# Patient Record
Sex: FEMALE | Race: WHITE | Employment: STUDENT | URBAN - METROPOLITAN AREA
[De-identification: names, ages, dates, MRNs, and addresses within clinical notes are randomized per-mention and may not be internally consistent; named-entity substitution may affect disease eponyms.]

---
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
## 2022-04-06 ENCOUNTER — NURSE ONLY (OUTPATIENT)
Dept: ELECTROPHYSIOLOGY | Facility: HOSPITAL | Age: 22
End: 2022-04-06
Attending: Other
Payer: COMMERCIAL

## 2022-04-06 DIAGNOSIS — G40.309 EPILEPSY SEIZURE, NONCONVULSIVE, GENERALIZED (HCC): ICD-10-CM

## 2022-04-06 PROCEDURE — 95816 EEG AWAKE AND DROWSY: CPT

## 2022-04-07 NOTE — PROCEDURES
[facility], [address] [place] [address]      PATIENT'S NAME: Any Zuluaga   ATTENDING PHYSICIAN: Quita Ward M.D. PATIENT ACCOUNT #: [de-identified] LOCATION: EEG   [facility]W   MEDICAL RECORD #: [id number] YOB: 2000   DATE OF SERVICE: 04/06/2022       ELECTROENCEPHALOGRAM REPORT    DATE OF EXAMINATION:  04/06/2022  AGE: 21 Yrs. SEX: F   EEG #: [id number]     1. 10-20 International System. 2.  Bipolar and monopolar recording. 3.  Routine recording (awake and asleep). 4.  Portable C.E.S.  5.  Impedance - 10 kilohms or less. CLINICAL HISTORY:  This patient has non-convulsive and generalized non-convulsive seizure, has been on lamotrigine. EEG was requested to rule out epileptiform activity as a followup study. Previous EEG last year on September 23, 2021, was abnormal.      INTERPRETATION:  Recording revealed background activity of 8-9 Hz. Amplitude of 20-50 microvolts was seen in bilateral hemispheres. Background activity was evenly distributed, reactive to eye opening. Stage 2 sleep was not evident, however, drowsiness was seen. No epileptiform activity was identified during this study. Activation procedure was not contributory. IMPRESSION:  This is a normal study. There is no epileptiform activity identified during this EEG recording. Clinical correlation is indicated.       Dictated By Quita Ward M.D.  d: 04/06/2022 13:16:57  t: 04/06/2022 14:56:26  [facility] [id number]  /

## 2022-04-13 ENCOUNTER — TELEPHONE (OUTPATIENT)
Dept: NEUROLOGY | Facility: CLINIC | Age: 22
End: 2022-04-13

## 2022-04-13 NOTE — TELEPHONE ENCOUNTER
----- Message from Quita Ward MD sent at 4/7/2022  8:27 AM CDT -----  EEG is normal    Sent on Jildy

## 2022-04-28 ENCOUNTER — OFFICE VISIT (OUTPATIENT)
Dept: NEUROLOGY | Facility: CLINIC | Age: 22
End: 2022-04-28
Payer: COMMERCIAL

## 2022-04-28 VITALS
RESPIRATION RATE: 12 BRPM | WEIGHT: 153 LBS | SYSTOLIC BLOOD PRESSURE: 110 MMHG | BODY MASS INDEX: 22.92 KG/M2 | HEART RATE: 101 BPM | DIASTOLIC BLOOD PRESSURE: 62 MMHG | HEIGHT: 68.5 IN

## 2022-04-28 DIAGNOSIS — R40.4 ALTERED AWARENESS, TRANSIENT: Primary | ICD-10-CM

## 2022-04-28 DIAGNOSIS — R55 BLACK-OUT (NOT AMNESIA): ICD-10-CM

## 2022-04-28 DIAGNOSIS — G40.309 EPILEPSY SEIZURE, NONCONVULSIVE, GENERALIZED (HCC): ICD-10-CM

## 2022-04-28 PROCEDURE — 3078F DIAST BP <80 MM HG: CPT | Performed by: OTHER

## 2022-04-28 PROCEDURE — 3008F BODY MASS INDEX DOCD: CPT | Performed by: OTHER

## 2022-04-28 PROCEDURE — 3074F SYST BP LT 130 MM HG: CPT | Performed by: OTHER

## 2022-04-28 PROCEDURE — 99214 OFFICE O/P EST MOD 30 MIN: CPT | Performed by: OTHER

## 2022-04-28 RX ORDER — LAMOTRIGINE 100 MG/1
TABLET ORAL
Qty: 360 TABLET | Refills: 3 | Status: SHIPPED | OUTPATIENT
Start: 2022-04-28

## 2022-04-28 RX ORDER — AMITRIPTYLINE HYDROCHLORIDE 10 MG/1
10 TABLET, FILM COATED ORAL NIGHTLY
Qty: 30 TABLET | Refills: 5 | Status: SHIPPED | OUTPATIENT
Start: 2022-04-28

## 2022-04-28 NOTE — PROGRESS NOTES
LOV 9/28/21 Seizure f/u- Patient is still having \"spacing out\" seizures. Patient denies any missed dose of lamotrigine.

## 2022-05-01 ENCOUNTER — PATIENT MESSAGE (OUTPATIENT)
Dept: NEUROLOGY | Facility: CLINIC | Age: 22
End: 2022-05-01

## 2022-05-02 NOTE — TELEPHONE ENCOUNTER
COREYB on VM (ok per HIPAA consent) to gather more information regarding Grand Mal seizure on Saturday.

## 2022-05-02 NOTE — TELEPHONE ENCOUNTER
S: Patient to ER on 4/30/2022 for seizure. Patient bit tongue and had laceration. Observed tonic-clinic activity. See ER Note. B: LOV 4/28/2022 -     titration Lamictal 150 mg BID to 200 mg BID    A: Patient reports she had Tonic-clonic seizure. Currently taking 150 mg Lamictal BID. Increasing to 200 mg Lamictal next week. Denies any increased substance used, denies any dehydration and reports she had a few hours less sleep, but denied lack of sleep. R: Routed to provider to advise. Pt aware to go to ER should she have another seizure lasting more than 5 min or seizures with no return to baseline.

## 2022-05-13 ENCOUNTER — TELEPHONE (OUTPATIENT)
Dept: NEUROLOGY | Facility: CLINIC | Age: 22
End: 2022-05-13

## 2022-05-13 RX ORDER — LAMOTRIGINE 200 MG/1
200 TABLET ORAL 2 TIMES DAILY
Qty: 180 TABLET | Refills: 1 | Status: SHIPPED | OUTPATIENT
Start: 2022-05-13

## 2022-05-13 NOTE — TELEPHONE ENCOUNTER
Pt calling for updated prescription for Lamotrigine. Pt was taking 150mg twice a day for a week and then is to increase to 200mg twice a week.      [facility] [address] [place] [address] OF [address], [phone], [phone]

## 2022-05-13 NOTE — TELEPHONE ENCOUNTER
Per Epic in TE dated 5/2/2022, Lamictal increased to 200 mg BID. Lior Briones MD  to [id number] [address] 1:06 PM  Note  Need to up to 200 mg bid          New RX sent to pharmacy reflecting new dosage and RN called pharmacy to verify receipt. Pharmacist verified receipt and stated they will fill. RN called patient and advised of above.

## 2022-07-01 ENCOUNTER — OFFICE VISIT (OUTPATIENT)
Dept: FAMILY MEDICINE CLINIC | Facility: CLINIC | Age: 22
End: 2022-07-01
Payer: COMMERCIAL

## 2022-07-01 VITALS
SYSTOLIC BLOOD PRESSURE: 110 MMHG | BODY MASS INDEX: 23.67 KG/M2 | HEIGHT: 68.5 IN | HEART RATE: 84 BPM | WEIGHT: 158 LBS | DIASTOLIC BLOOD PRESSURE: 60 MMHG | TEMPERATURE: 98 F | RESPIRATION RATE: 16 BRPM

## 2022-07-01 DIAGNOSIS — Z51.81 ENCOUNTER FOR MEDICATION MONITORING: ICD-10-CM

## 2022-07-01 DIAGNOSIS — G40.409 GRAND MAL SEIZURE DISORDER (HCC): Primary | ICD-10-CM

## 2022-07-01 DIAGNOSIS — R11.0 NAUSEA: ICD-10-CM

## 2022-07-01 DIAGNOSIS — G43.109 MIGRAINE WITH AURA AND WITHOUT STATUS MIGRAINOSUS, NOT INTRACTABLE: ICD-10-CM

## 2022-07-01 DIAGNOSIS — G40.309 EPILEPSY SEIZURE, NONCONVULSIVE, GENERALIZED (HCC): ICD-10-CM

## 2022-07-01 DIAGNOSIS — N94.6 DYSMENORRHEA: ICD-10-CM

## 2022-07-01 PROCEDURE — 99213 OFFICE O/P EST LOW 20 MIN: CPT | Performed by: FAMILY MEDICINE

## 2022-07-01 PROCEDURE — 3074F SYST BP LT 130 MM HG: CPT | Performed by: FAMILY MEDICINE

## 2022-07-01 PROCEDURE — 3078F DIAST BP <80 MM HG: CPT | Performed by: FAMILY MEDICINE

## 2022-07-01 PROCEDURE — 3008F BODY MASS INDEX DOCD: CPT | Performed by: FAMILY MEDICINE

## 2022-07-01 RX ORDER — ONDANSETRON 4 MG/1
4 TABLET, ORALLY DISINTEGRATING ORAL EVERY 8 HOURS PRN
Qty: 30 TABLET | Refills: 2 | Status: SHIPPED | OUTPATIENT
Start: 2022-07-01

## 2022-07-01 RX ORDER — NAPROXEN 500 MG/1
TABLET ORAL
Qty: 30 TABLET | Refills: 3 | Status: SHIPPED | OUTPATIENT
Start: 2022-07-01

## 2022-07-10 ENCOUNTER — PATIENT MESSAGE (OUTPATIENT)
Dept: FAMILY MEDICINE CLINIC | Facility: CLINIC | Age: 22
End: 2022-07-10

## 2022-07-13 ENCOUNTER — TELEPHONE (OUTPATIENT)
Dept: FAMILY MEDICINE CLINIC | Facility: CLINIC | Age: 22
End: 2022-07-13

## 2022-07-13 NOTE — TELEPHONE ENCOUNTER
Message left on pt's cell number re: symptoms. [employer] message sent to pt re: symptoms. If symptoms worse - cough/shorntes of breath - to go to immediate care for further evaluation.

## 2022-07-24 ENCOUNTER — OFFICE VISIT (OUTPATIENT)
Dept: FAMILY MEDICINE CLINIC | Facility: CLINIC | Age: 22
End: 2022-07-24
Payer: COMMERCIAL

## 2022-07-24 VITALS
SYSTOLIC BLOOD PRESSURE: 122 MMHG | DIASTOLIC BLOOD PRESSURE: 64 MMHG | OXYGEN SATURATION: 98 % | HEIGHT: 68 IN | TEMPERATURE: 98 F | WEIGHT: 150 LBS | BODY MASS INDEX: 22.73 KG/M2 | RESPIRATION RATE: 18 BRPM | HEART RATE: 107 BPM

## 2022-07-24 DIAGNOSIS — J01.40 ACUTE NON-RECURRENT PANSINUSITIS: Primary | ICD-10-CM

## 2022-07-24 RX ORDER — AMOXICILLIN AND CLAVULANATE POTASSIUM 875; 125 MG/1; MG/1
1 TABLET, FILM COATED ORAL 2 TIMES DAILY
Qty: 20 TABLET | Refills: 0 | Status: SHIPPED | OUTPATIENT
Start: 2022-07-24 | End: 2022-08-03

## 2022-07-24 RX ORDER — BENZONATATE 200 MG/1
200 CAPSULE ORAL 3 TIMES DAILY PRN
Qty: 20 CAPSULE | Refills: 0 | Status: SHIPPED | OUTPATIENT
Start: 2022-07-24 | End: 2022-07-31

## 2022-09-27 ENCOUNTER — PATIENT MESSAGE (OUTPATIENT)
Dept: FAMILY MEDICINE CLINIC | Facility: CLINIC | Age: 22
End: 2022-09-27

## 2022-09-27 NOTE — TELEPHONE ENCOUNTER
Shawn Rizvi RN 9/27/2022 10:47 AM CDT      ----- Message -----  From: Marissa Martini  Sent: 9/27/2022 10:45 AM CDT  To: [facility] Clinical Staff  Subject: Switching Pharmacies     Hello I recently moved to [place] for school I was wondering if you could switch my pharmacy from the one I have at home to the one I added on Mychart. So i can get my medication here.

## 2022-10-04 ENCOUNTER — PATIENT MESSAGE (OUTPATIENT)
Dept: NEUROLOGY | Facility: CLINIC | Age: 22
End: 2022-10-04

## 2022-10-04 DIAGNOSIS — G40.309 EPILEPSY SEIZURE, NONCONVULSIVE, GENERALIZED (HCC): ICD-10-CM

## 2022-10-04 RX ORDER — LAMOTRIGINE 200 MG/1
200 TABLET ORAL 2 TIMES DAILY
Qty: 180 TABLET | Refills: 0 | Status: SHIPPED | OUTPATIENT
Start: 2022-10-04

## 2022-10-04 NOTE — TELEPHONE ENCOUNTER
From: Gayatri Moreira  To: Sundeep Montes DO  Sent: 10/4/2022 11:30 AM CDT  Subject: Medication     Hello, do you think you can send my prescription over to the pharmacy I have on my Mychart? I need to get my medicine I have barely any left. Specifically my Lamotrigine please.

## 2022-11-03 ENCOUNTER — PATIENT MESSAGE (OUTPATIENT)
Dept: NEUROLOGY | Facility: CLINIC | Age: 22
End: 2022-11-03

## 2022-11-03 DIAGNOSIS — G40.309 EPILEPSY SEIZURE, NONCONVULSIVE, GENERALIZED (HCC): ICD-10-CM

## 2022-11-04 ENCOUNTER — OFFICE VISIT (OUTPATIENT)
Dept: NEUROLOGY | Facility: CLINIC | Age: 22
End: 2022-11-04
Payer: COMMERCIAL

## 2022-11-04 VITALS
BODY MASS INDEX: 27 KG/M2 | RESPIRATION RATE: 16 BRPM | WEIGHT: 180.38 LBS | SYSTOLIC BLOOD PRESSURE: 112 MMHG | HEART RATE: 82 BPM | DIASTOLIC BLOOD PRESSURE: 72 MMHG

## 2022-11-04 DIAGNOSIS — R40.4 ALTERED AWARENESS, TRANSIENT: ICD-10-CM

## 2022-11-04 DIAGNOSIS — G40.109 SYMPTOMATIC LOCALIZATION-RELATED EPILEPSY (HCC): Primary | ICD-10-CM

## 2022-11-04 DIAGNOSIS — G43.009 MIGRAINE WITHOUT AURA AND WITHOUT STATUS MIGRAINOSUS, NOT INTRACTABLE: ICD-10-CM

## 2022-11-04 PROCEDURE — 3078F DIAST BP <80 MM HG: CPT | Performed by: OTHER

## 2022-11-04 PROCEDURE — 99215 OFFICE O/P EST HI 40 MIN: CPT | Performed by: OTHER

## 2022-11-04 PROCEDURE — 3074F SYST BP LT 130 MM HG: CPT | Performed by: OTHER

## 2022-11-04 RX ORDER — LAMOTRIGINE 200 MG/1
200 TABLET ORAL 2 TIMES DAILY
Qty: 180 TABLET | Refills: 0 | Status: SHIPPED | OUTPATIENT
Start: 2022-11-04

## 2022-11-04 RX ORDER — LAMOTRIGINE 50 MG/1
TABLET, ORALLY DISINTEGRATING ORAL
Qty: 30 TABLET | Refills: 0 | Status: SHIPPED | OUTPATIENT
Start: 2022-11-04

## 2022-11-04 RX ORDER — LEVETIRACETAM 500 MG/1
TABLET ORAL
Qty: 90 TABLET | Refills: 0 | Status: SHIPPED | OUTPATIENT
Start: 2022-11-04

## 2022-11-04 NOTE — PROGRESS NOTES
After last visit 2 days had a grand mall seizure. States has absent seizures almost daily about 15 seconds.

## 2022-11-17 ENCOUNTER — PATIENT MESSAGE (OUTPATIENT)
Dept: NEUROLOGY | Facility: CLINIC | Age: 22
End: 2022-11-17

## 2022-11-17 DIAGNOSIS — G40.109 SYMPTOMATIC LOCALIZATION-RELATED EPILEPSY (HCC): Primary | ICD-10-CM

## 2022-11-18 ENCOUNTER — NURSE ONLY (OUTPATIENT)
Dept: ELECTROPHYSIOLOGY | Facility: HOSPITAL | Age: 22
End: 2022-11-18
Attending: Other
Payer: COMMERCIAL

## 2022-11-18 DIAGNOSIS — R40.4 ALTERED AWARENESS, TRANSIENT: ICD-10-CM

## 2022-11-18 DIAGNOSIS — G40.109 SYMPTOMATIC LOCALIZATION-RELATED EPILEPSY (HCC): ICD-10-CM

## 2022-11-18 PROCEDURE — 95819 EEG AWAKE AND ASLEEP: CPT | Performed by: OTHER

## 2022-11-18 RX ORDER — LEVETIRACETAM 500 MG/1
1000 TABLET ORAL 2 TIMES DAILY
Qty: 1 TABLET | Refills: 0 | COMMUNITY
Start: 2022-11-18

## 2022-11-18 NOTE — TELEPHONE ENCOUNTER
From: Byron Brothers  To: Troy Marshall DO  Sent: 11/17/2022 5:28 PM CST  Subject: Grand Mal Seizure     Hello, I just had a grand mal seizure about an hour ago. I have been taking my medication as prescribed to me I am however on my period. I have been taking the 200mg twice a day of the lamotrigine and on top of that the extra 50 of the dissolvable pill version and the 500 mg twice a day of the new medication prescribed to me. I have my EEG tomorrow. I have bit my tongue really bad and was spitting up blood. I had trouble remembering my name and my birthday and even realizing what happened. I wanted to let you know what happened so I can follow up with you on this incident.

## 2022-11-18 NOTE — TELEPHONE ENCOUNTER
Discussed message received from patient with Dr Lynette Minaya. Patient to increase Keppra to 1000mg bid, have Lamictal level done today when she comes in for her EEG at 10:30 per Dr Lnyette Minaya verbally. New dosage updated in Epic historically. Patient reports she was not sleep deprived before seizure occurred.

## 2022-11-18 NOTE — PROCEDURES
ELECTROENCEPHALOGRAM REPORT      Patient Name: Sofía Hoffmann   : 2000   Requesting Physician: Dr. Geoff Mendiola  Date of Test: 2022   History: 25year old female with history of seizures, with frequent episodes of loss of awareness. TECHNICAL ASPECTS OF EEG RECORDING:  This is a scalp EEG monitoring study. Scalp electrodes were positioned according to the 10-20 International system of electrode placement. EEG data were recorded continuously and digitally stored, and this is a routine study. No sedation was given. EEG data were reviewed using bipolar and referential montages. Video recording is also present. DESCRIPTION OF EEG FINDINGS:  BACKGROUND ACTIVITY: The background rhythm consists of well organized, medium amplitude 9 Hertz waves, which are symmetrical. They do attenuate with eye opening. During the recording there were intermittent periods lasting 2 seconds, of right frontal predominant rhythmic appearing theta activity, in the 5-6Hz range. INTERICTAL EPILEPTIFORM ACTIVITY: A rare right frontal F4 sharp wave was seen  ICTAL ACTIVITY: No clear ictal activity  ACTIVATION PROCEDURES: The above rhythmic appearing theta activity was seen during hyperventilation as well. Delfina Led Photic stimulation produced no epileptiform activity. SLEEP: Sleep demonstrated decreased amplitude with diffuse slowing and no epileptiform activity. IMPRESSION:  This is an abnormal EEG. There was a rare sharp, as well as intermittent rhythmic appearing theta activity lasting 2.5 seconds that was seen in the right frontal region. This indicates a lower threshold for seizure activity.         Sebastien Mcgraw DO  Neuromuscular and General Neurology  [facility]

## 2022-12-20 ENCOUNTER — OFFICE VISIT (OUTPATIENT)
Dept: FAMILY MEDICINE CLINIC | Facility: CLINIC | Age: 22
End: 2022-12-20
Payer: COMMERCIAL

## 2022-12-20 VITALS
HEART RATE: 82 BPM | OXYGEN SATURATION: 99 % | WEIGHT: 184.5 LBS | BODY MASS INDEX: 27.64 KG/M2 | DIASTOLIC BLOOD PRESSURE: 60 MMHG | HEIGHT: 68.5 IN | RESPIRATION RATE: 16 BRPM | SYSTOLIC BLOOD PRESSURE: 102 MMHG | TEMPERATURE: 97 F

## 2022-12-20 DIAGNOSIS — G43.109 MIGRAINE WITH AURA AND WITHOUT STATUS MIGRAINOSUS, NOT INTRACTABLE: ICD-10-CM

## 2022-12-20 DIAGNOSIS — G40.409 GRAND MAL SEIZURE DISORDER (HCC): ICD-10-CM

## 2022-12-20 DIAGNOSIS — Z83.49 FAMILY HISTORY OF THYROID DISORDER: ICD-10-CM

## 2022-12-20 DIAGNOSIS — R63.5 WEIGHT GAIN: ICD-10-CM

## 2022-12-20 DIAGNOSIS — Z00.00 LABORATORY EXAMINATION ORDERED AS PART OF A COMPLETE PHYSICAL EXAMINATION: ICD-10-CM

## 2022-12-20 DIAGNOSIS — G40.109 SYMPTOMATIC LOCALIZATION-RELATED EPILEPSY (HCC): ICD-10-CM

## 2022-12-20 DIAGNOSIS — D22.9 MULTIPLE NEVI: ICD-10-CM

## 2022-12-20 DIAGNOSIS — Z00.00 ROUTINE PHYSICAL EXAMINATION: Primary | ICD-10-CM

## 2022-12-20 DIAGNOSIS — Z23 NEED FOR VACCINATION: ICD-10-CM

## 2022-12-20 DIAGNOSIS — Z13.21 ENCOUNTER FOR VITAMIN DEFICIENCY SCREENING: ICD-10-CM

## 2022-12-20 PROCEDURE — 90686 IIV4 VACC NO PRSV 0.5 ML IM: CPT | Performed by: FAMILY MEDICINE

## 2022-12-20 PROCEDURE — 3078F DIAST BP <80 MM HG: CPT | Performed by: FAMILY MEDICINE

## 2022-12-20 PROCEDURE — 90471 IMMUNIZATION ADMIN: CPT | Performed by: FAMILY MEDICINE

## 2022-12-20 PROCEDURE — 3008F BODY MASS INDEX DOCD: CPT | Performed by: FAMILY MEDICINE

## 2022-12-20 PROCEDURE — 99395 PREV VISIT EST AGE 18-39: CPT | Performed by: FAMILY MEDICINE

## 2022-12-20 PROCEDURE — 3074F SYST BP LT 130 MM HG: CPT | Performed by: FAMILY MEDICINE

## 2023-02-06 ENCOUNTER — OFFICE VISIT (OUTPATIENT)
Dept: NEUROLOGY | Facility: CLINIC | Age: 23
End: 2023-02-06
Payer: COMMERCIAL

## 2023-02-06 VITALS
DIASTOLIC BLOOD PRESSURE: 60 MMHG | RESPIRATION RATE: 16 BRPM | HEART RATE: 114 BPM | SYSTOLIC BLOOD PRESSURE: 132 MMHG | WEIGHT: 165 LBS | BODY MASS INDEX: 25 KG/M2

## 2023-02-06 DIAGNOSIS — G40.109 SYMPTOMATIC LOCALIZATION-RELATED EPILEPSY (HCC): Primary | ICD-10-CM

## 2023-02-06 DIAGNOSIS — G40.309 EPILEPSY SEIZURE, NONCONVULSIVE, GENERALIZED (HCC): ICD-10-CM

## 2023-02-06 DIAGNOSIS — S01.512A LACERATION OF TONGUE, INITIAL ENCOUNTER: ICD-10-CM

## 2023-02-06 PROCEDURE — 3078F DIAST BP <80 MM HG: CPT | Performed by: OTHER

## 2023-02-06 PROCEDURE — 3075F SYST BP GE 130 - 139MM HG: CPT | Performed by: OTHER

## 2023-02-06 PROCEDURE — 99214 OFFICE O/P EST MOD 30 MIN: CPT | Performed by: OTHER

## 2023-02-06 RX ORDER — LAMOTRIGINE 200 MG/1
200 TABLET ORAL 2 TIMES DAILY
Qty: 180 TABLET | Refills: 3 | Status: SHIPPED | OUTPATIENT
Start: 2023-02-06

## 2023-02-06 RX ORDER — LAMOTRIGINE 50 MG/1
TABLET, ORALLY DISINTEGRATING ORAL
Qty: 90 TABLET | Refills: 3 | Status: SHIPPED | OUTPATIENT
Start: 2023-02-06

## 2023-02-06 RX ORDER — AMITRIPTYLINE HYDROCHLORIDE 25 MG/1
25 TABLET, FILM COATED ORAL NIGHTLY
Qty: 90 TABLET | Refills: 3 | Status: SHIPPED | OUTPATIENT
Start: 2023-02-06

## 2023-02-06 RX ORDER — LEVETIRACETAM 500 MG/1
TABLET ORAL
Qty: 540 TABLET | Refills: 3 | Status: SHIPPED | OUTPATIENT
Start: 2023-02-06

## 2023-02-06 NOTE — PROGRESS NOTES
Patient states 3 seizures in January. Patient states a seizure on 6th, 24th,and 30th of January. Patient denies seizures activity in February. Denies changes to memory, speech or balance. Patient denies increase in HA. Patient states during each seizure she bit her tongue. Patient states some changes in the way she speak.

## 2023-03-20 ENCOUNTER — TELEPHONE (OUTPATIENT)
Dept: SURGERY | Facility: CLINIC | Age: 23
End: 2023-03-20

## 2023-03-20 NOTE — TELEPHONE ENCOUNTER
Pt is at school in [place]. Pt went to ER last night for two seizures. ER provider wants to add another medication, Zonisanide. Father wants to discuss before giving her the medication. Best call back number is [phone]. Also father is following-up on paperwork provided in December 2022 and February 2023 that was supposed to be sent to her school.

## 2023-03-20 NOTE — TELEPHONE ENCOUNTER
Her previous seizures have been due to running out of medication. Had she missed any Keppra or Lamictal doses? Was she/has she been taking 1500mg BID of Keppra, and 250/200mg of the Lamictal? Also, I recommend checking and sending to me blood levels of the Lamictal and Keppra 2 weeks after the last clinic visit.

## 2023-03-21 ENCOUNTER — PATIENT MESSAGE (OUTPATIENT)
Dept: NEUROLOGY | Facility: CLINIC | Age: 23
End: 2023-03-21

## 2023-03-21 DIAGNOSIS — G40.309 EPILEPSY SEIZURE, NONCONVULSIVE, GENERALIZED (HCC): Primary | ICD-10-CM

## 2023-03-21 NOTE — TELEPHONE ENCOUNTER
From: Melonie Longo  To: Truman Macdonald DO  Sent: 3/21/2023 12:07 PM CDT  Subject: Lab Work     I am trying to get the lab work done, but Will in [place], [place]. Can you send me an order for lab work so the lab knows what tests to run. I looked in my mychart and I can find anything for blood work. Have a good rest of your day!

## 2023-03-21 NOTE — TELEPHONE ENCOUNTER
Lab orders signed by Dr Neeru Horner and faxed to fax # provided by patient with fax confirmation received,

## 2023-03-21 NOTE — TELEPHONE ENCOUNTER
Spoke with patient's father and patient. Patient states she has been taking Keppra 1500mg bid and Lamictal 250mg in am and 200mg nightly and had not missed any doses. Father states the ER did blood work and he will have hospital fax lab results to Dr Angelica Olivarez at [phone]. Dr Angelica Olivarez notified of above. Maria Elena Greene also inquired about the paper work that was submitted in December. Not able to locate paper work. Maria Elena Greene states he will have paper work faxed to [phone].

## 2023-03-21 NOTE — TELEPHONE ENCOUNTER
From: Pelon Hernández  Sent: 3/21/2023 1:16 PM CDT  To: Santhosh Carlson Nurse  Subject: Lab orders    They said that it can be any Doctor in the office to sign off on the paperwork, if that makes it easier for you. English

## 2023-03-22 ENCOUNTER — PATIENT MESSAGE (OUTPATIENT)
Dept: NEUROLOGY | Facility: CLINIC | Age: 23
End: 2023-03-22

## 2023-03-23 ENCOUNTER — TELEPHONE (OUTPATIENT)
Dept: SURGERY | Facility: CLINIC | Age: 23
End: 2023-03-23

## 2023-03-23 NOTE — TELEPHONE ENCOUNTER
Labs received.   3/21/23 2pm [facility]\"" 38   Lamictal 6.1    Called mobile number and left VM to increase Lamictal to 250mg BID, if taking 200/250mg currently. May need to add 3rd seizure medication, but first would like to discuss if there were any possible triggers of the seizure last week.

## 2023-03-30 ENCOUNTER — TELEPHONE (OUTPATIENT)
Dept: NEUROLOGY | Facility: CLINIC | Age: 23
End: 2023-03-30

## 2023-03-30 NOTE — TELEPHONE ENCOUNTER
Pt states she has Epilepsy and for the past 4 days she has been falling and has been very dizzy. Pt is requesting a call back from a nurse to discuss.

## 2023-03-30 NOTE — TELEPHONE ENCOUNTER
Called patient and left message on VM also sent a MyChart message with Dr. Tiffanie Adan recommendations.

## 2023-04-03 ENCOUNTER — TELEPHONE (OUTPATIENT)
Dept: SURGERY | Facility: CLINIC | Age: 23
End: 2023-04-03

## 2023-04-11 ENCOUNTER — TELEPHONE (OUTPATIENT)
Dept: SURGERY | Facility: CLINIC | Age: 23
End: 2023-04-11

## 2023-04-11 RX ORDER — ZONISAMIDE 50 MG/1
50 CAPSULE ORAL 2 TIMES DAILY
Qty: 60 CAPSULE | Refills: 0 | Status: SHIPPED | OUTPATIENT
Start: 2023-04-11

## 2023-04-11 NOTE — TELEPHONE ENCOUNTER
Discussed with father. Recommended to admit and obtain MRI brain, and to start 3rd seizure medication, consult with neurology at the hospital there. Needs to see epileptologist, has appointment beginning of May. Father requested that I send a script for a 3rd medication. Recommend starting zonisamide, sent.

## 2023-04-11 NOTE — TELEPHONE ENCOUNTER
Pt had had two seizures today one in the morning and one at the ER. Patient's roommates were home, but father does not believe the seizure was witnessed. Pt's father reports pt bite her tongue during am seizure. Pt reportedly very confused at ER right now, pt is currently sedated in ER. Pt's father is unsure any other information as he is not with patient. Routed to provider.

## 2023-04-11 NOTE — TELEPHONE ENCOUNTER
Pts father is calling to inform pt is currently at [facility] ED due to having another seizure pt would like to speak to nurse please advise

## 2023-04-12 RX ORDER — ZONISAMIDE 50 MG/1
50 CAPSULE ORAL 2 TIMES DAILY
Qty: 60 CAPSULE | Refills: 0 | OUTPATIENT
Start: 2023-04-12

## 2023-04-17 ENCOUNTER — TELEPHONE (OUTPATIENT)
Dept: NEUROLOGY | Facility: CLINIC | Age: 23
End: 2023-04-17

## 2023-04-17 ENCOUNTER — OFFICE VISIT (OUTPATIENT)
Dept: NEUROLOGY | Facility: CLINIC | Age: 23
End: 2023-04-17
Payer: COMMERCIAL

## 2023-04-17 VITALS
HEART RATE: 110 BPM | RESPIRATION RATE: 16 BRPM | BODY MASS INDEX: 27 KG/M2 | DIASTOLIC BLOOD PRESSURE: 58 MMHG | WEIGHT: 181.38 LBS | SYSTOLIC BLOOD PRESSURE: 118 MMHG

## 2023-04-17 DIAGNOSIS — G40.919 REFRACTORY EPILEPSY (HCC): Primary | ICD-10-CM

## 2023-04-17 DIAGNOSIS — G40.109 SYMPTOMATIC LOCALIZATION-RELATED EPILEPSY (HCC): ICD-10-CM

## 2023-04-17 DIAGNOSIS — G43.009 MIGRAINE WITHOUT AURA AND WITHOUT STATUS MIGRAINOSUS, NOT INTRACTABLE: ICD-10-CM

## 2023-04-17 DIAGNOSIS — S01.512A LACERATION OF TONGUE, INITIAL ENCOUNTER: Primary | ICD-10-CM

## 2023-04-17 PROCEDURE — 3074F SYST BP LT 130 MM HG: CPT | Performed by: OTHER

## 2023-04-17 PROCEDURE — 99214 OFFICE O/P EST MOD 30 MIN: CPT | Performed by: OTHER

## 2023-04-17 PROCEDURE — 3078F DIAST BP <80 MM HG: CPT | Performed by: OTHER

## 2023-04-17 RX ORDER — ZOLPIDEM TARTRATE 5 MG/1
1 TABLET ORAL NIGHTLY PRN
COMMUNITY
Start: 2023-04-12

## 2023-04-17 RX ORDER — TOPIRAMATE 25 MG/1
TABLET ORAL
Qty: 120 TABLET | Refills: 0 | Status: SHIPPED | OUTPATIENT
Start: 2023-04-17

## 2023-04-17 RX ORDER — HYDROCODONE BITARTRATE AND ACETAMINOPHEN 5; 325 MG/1; MG/1
TABLET ORAL
Qty: 10 TABLET | Refills: 0 | Status: SHIPPED | OUTPATIENT
Start: 2023-04-17

## 2023-04-17 NOTE — TELEPHONE ENCOUNTER
Pt is calling to request that the HYDROcodone-acetaminophen ([address]) 5-325 MG Oral Tab be canceled at [facility], . Carlyn Palomo [address] [facility], [phone], [phone] and transferred to [place] [address] [address] [place] [address], [phone], [phone]

## 2023-04-17 NOTE — TELEPHONE ENCOUNTER
Received disability documentation form from patient. Completed and signed by provider. Handed back to patient. Copy sent to scanning.

## 2023-04-17 NOTE — TELEPHONE ENCOUNTER
RN called [facility] in [place] and confirmed pt has NOT picked up RX for [employer].    RN cancelled above noted RX and pended new RX for provider to requested pharmacy and routed for review and signature.

## 2023-05-02 ENCOUNTER — PATIENT MESSAGE (OUTPATIENT)
Dept: NEUROLOGY | Facility: CLINIC | Age: 23
End: 2023-05-02

## 2023-05-12 DIAGNOSIS — G43.009 MIGRAINE WITHOUT AURA AND WITHOUT STATUS MIGRAINOSUS, NOT INTRACTABLE: Primary | ICD-10-CM

## 2023-05-12 RX ORDER — TOPIRAMATE 50 MG/1
TABLET, FILM COATED ORAL
Qty: 90 TABLET | Refills: 0 | Status: SHIPPED | OUTPATIENT
Start: 2023-05-12

## 2023-05-16 DIAGNOSIS — G43.009 MIGRAINE WITHOUT AURA AND WITHOUT STATUS MIGRAINOSUS, NOT INTRACTABLE: ICD-10-CM

## 2023-05-16 RX ORDER — TOPIRAMATE 50 MG/1
TABLET, FILM COATED ORAL
Qty: 60 TABLET | Refills: 2 | OUTPATIENT
Start: 2023-05-16

## 2023-05-16 NOTE — TELEPHONE ENCOUNTER
Please tell patient I sent [email or web address] message 5/2 regarding Topamax- she has not read. This will influence the refill dose. If no seizures since last visit, may be able to stay at 50mg BID, but this is likely too low of a dose to control seizures. Can help migraines at this dose. Overall, I am ok with keeping at 50mg BID at this time if has had no seizures.

## 2023-05-17 NOTE — TELEPHONE ENCOUNTER
Contacted  pt on confidential phone line (Ok per verbal release) and left detailed message. Wanted to clarify with pt:   1.) If she picked up Topiramate 50mg RX that we sent to the pharmacy on 5/12/23.  2.) If she has been tolerating current dosage of 50mg BID without seizures or seizure-like activity. Asked pt to contact our office either by phone or via [address] Left phone# and office hours.

## 2023-05-24 NOTE — TELEPHONE ENCOUNTER
Called & spoke with pt. Stated she has been tolerating Topiramate 50mg BID well, with no seizure or seizure-like activity to report. She did  the RX that was sent on 5/12 & doesn't need any refills until next appt. No further questions or concerns.

## 2023-07-10 DIAGNOSIS — G43.009 MIGRAINE WITHOUT AURA AND WITHOUT STATUS MIGRAINOSUS, NOT INTRACTABLE: ICD-10-CM

## 2023-07-10 RX ORDER — TOPIRAMATE 50 MG/1
TABLET, FILM COATED ORAL
Qty: 60 TABLET | Refills: 0 | Status: SHIPPED | OUTPATIENT
Start: 2023-07-10

## 2023-07-10 RX ORDER — TOPIRAMATE 50 MG/1
TABLET, FILM COATED ORAL
Qty: 289 TABLET | Refills: 0 | Status: SHIPPED | OUTPATIENT
Start: 2023-07-10 | End: 2023-07-10

## 2023-07-10 NOTE — TELEPHONE ENCOUNTER
Medication: TOPIRAMATE 50 MG Oral Tab     Date of last refill: 05/12/23 (90/0)  [place] 91/63/81    Last office visit: 04/17/23  Due back to clinic per last office note:  4 months  Date next office visit scheduled:    Future Appointments   Date Time Provider Derrick Malonei   8/10/2023  2:35 PM Reda Di, DO [facility]        Last OV note recommendation:     Plan:  Refractory epilepsy (hcc)  (primary encounter diagnosis)  Symptomatic localization-related epilepsy (hcc)  Migraine without aura and without status migrainosus, not intractable        Uncontrolled focal onset epilepsy- no clear evidence that low doses of elavil can precipitate seizures, but as her migraines are not controlled, I would stay off the elavil  Continue Lamictal 225mg in am, 250mg at night  Continue Keppra 1500mg twice a day  Add topamax for seizure prophylaxis and migraine prophylaxis, building to 50/100mg, then adjust further  Off amitriptyline and needs another agent for migraines. Elavil was not helping migraines. Add topamax and titrate dose in 4 weeks  Continued no driving, no climbing above own height, no operating heavy machinery, no bathing or swimming alone, use back burners, ok to bike but with a helmet, and only on bike path  Right facial tingling episodes- total 10 in a month- may be migraine aura, preceded migraine by 1-2 hours. If don't improve with topamax, consider repeat EEG.   Continue with evaluation at epilepsy center  Filled out disability form- extensions if hospitalized        Migraine- start topamax, discussed migraine triggers

## 2023-07-20 RX ORDER — LAMOTRIGINE 25 MG/1
TABLET, ORALLY DISINTEGRATING ORAL
Qty: 270 TABLET | Refills: 0 | Status: SHIPPED | OUTPATIENT
Start: 2023-07-20

## 2023-07-20 NOTE — TELEPHONE ENCOUNTER
Medication: Lamotrigine     Date of last refill: 04/07/2023 (#270/0)  [place] 68/01/4757 #30     Last office visit: 04/17/2023  Due back to clinic per last office note:  4 months  Date next office visit scheduled:    Future Appointments   Date Time Provider Derrick Malonei   8/10/2023  2:35 PM Sonam Merritt, DO [facility]           Last OV note recommendation:    Impression/Plan:  Refractory epilepsy (hcc)  (primary encounter diagnosis)  Symptomatic localization-related epilepsy (hcc)  Migraine without aura and without status migrainosus, not intractable        Uncontrolled focal onset epilepsy- no clear evidence that low doses of elavil can precipitate seizures, but as her migraines are not controlled, I would stay off the elavil  Continue Lamictal 225mg in am, 250mg at night  Continue Keppra 1500mg twice a day  Add topamax for seizure prophylaxis and migraine prophylaxis, building to 50/100mg, then adjust further  Off amitriptyline and needs another agent for migraines. Elavil was not helping migraines. Add topamax and titrate dose in 4 weeks  Continued no driving, no climbing above own height, no operating heavy machinery, no bathing or swimming alone, use back burners, ok to bike but with a helmet, and only on bike path  Right facial tingling episodes- total 10 in a month- may be migraine aura, preceded migraine by 1-2 hours. If don't improve with topamax, consider repeat EEG.   Continue with evaluation at epilepsy center  Filled out disability form- extensions if hospitalized        Migraine- start topamax, discussed migraine triggers

## 2023-11-18 ENCOUNTER — OFFICE VISIT (OUTPATIENT)
Dept: FAMILY MEDICINE CLINIC | Facility: CLINIC | Age: 23
End: 2023-11-18
Payer: COMMERCIAL

## 2023-11-18 VITALS
DIASTOLIC BLOOD PRESSURE: 60 MMHG | SYSTOLIC BLOOD PRESSURE: 100 MMHG | BODY MASS INDEX: 22.22 KG/M2 | TEMPERATURE: 98 F | OXYGEN SATURATION: 98 % | HEART RATE: 98 BPM | HEIGHT: 69 IN | RESPIRATION RATE: 18 BRPM | WEIGHT: 150 LBS

## 2023-11-18 DIAGNOSIS — J32.9 SINOBRONCHITIS: Primary | ICD-10-CM

## 2023-11-18 DIAGNOSIS — J40 SINOBRONCHITIS: Primary | ICD-10-CM

## 2023-11-18 PROCEDURE — 99213 OFFICE O/P EST LOW 20 MIN: CPT | Performed by: NURSE PRACTITIONER

## 2023-11-18 PROCEDURE — 3078F DIAST BP <80 MM HG: CPT | Performed by: NURSE PRACTITIONER

## 2023-11-18 PROCEDURE — 3074F SYST BP LT 130 MM HG: CPT | Performed by: NURSE PRACTITIONER

## 2023-11-18 PROCEDURE — 3008F BODY MASS INDEX DOCD: CPT | Performed by: NURSE PRACTITIONER

## 2023-11-18 RX ORDER — ALBUTEROL SULFATE 90 UG/1
2 AEROSOL, METERED RESPIRATORY (INHALATION) EVERY 4 HOURS PRN
Qty: 1 EACH | Refills: 0 | Status: SHIPPED | OUTPATIENT
Start: 2023-11-18 | End: 2023-12-02

## 2023-11-18 RX ORDER — AMOXICILLIN AND CLAVULANATE POTASSIUM 875; 125 MG/1; MG/1
1 TABLET, FILM COATED ORAL 2 TIMES DAILY
Qty: 20 TABLET | Refills: 0 | Status: SHIPPED | OUTPATIENT
Start: 2023-11-18 | End: 2023-11-28

## 2023-11-18 NOTE — PATIENT INSTRUCTIONS
I recommend the 4 H's for inflammation:    1. Heat (warm mist from the shower or warm liquids such as tea)  2. Honey (mixed in your tea or by the spoonful [if you are not diabetic; over the age of 1 year]--take a spoonful 3 times a day and don't eat or drink anything for 15-20 minutes)  3. Humidity--cool mist in the bedroom at night  4.  Hydration --at least 8 -10 glasses a day     Go to ER for any difficulty breathing

## 2024-03-18 ENCOUNTER — TELEPHONE (OUTPATIENT)
Dept: FAMILY MEDICINE CLINIC | Facility: CLINIC | Age: 24
End: 2024-03-18

## 2025-01-17 ENCOUNTER — MED REC SCAN ONLY (OUTPATIENT)
Dept: FAMILY MEDICINE CLINIC | Facility: CLINIC | Age: 25
End: 2025-01-17

## (undated) DIAGNOSIS — G40.309 EPILEPSY SEIZURE, NONCONVULSIVE, GENERALIZED (HCC): ICD-10-CM

## (undated) NOTE — MR AVS SNAPSHOT
[facility]  [address] [id number] [phone]               Thank you for choosing us for your health care visit with YI Fritz. We are glad to serve you and happy to provide you with this summary of your visit.   Please he care if shortness of breath, inability to swallow saliva or breathe.        Allergies as of Mar 10, 2017     Seasonal                 Today's Vital Signs     BP Pulse    100/60 mmHg (13 %, Z = -1.11 / 29 %, Z = -0.56*) 92    Temp Height    98.3 °F (36.8 °C) often. Sometimes toddlers need to try a food 10 times before they actually accept and enjoy it. It is also important to encourage play time as soon as they start crawling and walking.  As your children grow, continue to help them live a healthy active lifes

## (undated) NOTE — LETTER
01/10/22        Artem Cueva  [address]      Dear Ian Lozano,    [address] [facility] records indicate that you have outstanding lab work and or testing that was ordered for you and has not yet been completed:  Orders Placed This Encounter

## (undated) NOTE — Clinical Note
I saw Thomas Sy in the [facility] in [facility] today for strep pharyngitis,  she was treated with Amoxicillin. Thomas Jacksonbart will follow up with you if no better or as needed.  Thank you for the opportunity to care for YI Tapia